# Patient Record
Sex: FEMALE | Race: WHITE | Employment: FULL TIME | URBAN - METROPOLITAN AREA
[De-identification: names, ages, dates, MRNs, and addresses within clinical notes are randomized per-mention and may not be internally consistent; named-entity substitution may affect disease eponyms.]

---

## 2017-04-21 RX ORDER — ESTRADIOL 0.1 MG/G
CREAM VAGINAL
Qty: 42.5 G | Refills: 0 | Status: SHIPPED | OUTPATIENT
Start: 2017-04-21 | End: 2017-10-23

## 2017-04-21 NOTE — TELEPHONE ENCOUNTER
Pt had mammogram done at [facility] in 9/2016. Pt will fax results to our office. Pt will call back to schedule annual in July. Refill ordered.

## 2017-04-21 NOTE — TELEPHONE ENCOUNTER
Please inform pt she needs to go for mammogram.  [id number] [address] Bauer for refill   Please make sure she has her annual visit scheduled as well

## 2017-10-23 ENCOUNTER — OFFICE VISIT (OUTPATIENT)
Dept: OBGYN CLINIC | Facility: CLINIC | Age: 57
End: 2017-10-23

## 2017-10-23 VITALS
BODY MASS INDEX: 26.1 KG/M2 | HEIGHT: 63.75 IN | SYSTOLIC BLOOD PRESSURE: 108 MMHG | HEART RATE: 70 BPM | WEIGHT: 151 LBS | DIASTOLIC BLOOD PRESSURE: 64 MMHG

## 2017-10-23 DIAGNOSIS — Z01.419 WELL WOMAN EXAM WITH ROUTINE GYNECOLOGICAL EXAM: Primary | ICD-10-CM

## 2017-10-23 DIAGNOSIS — N95.2 VAGINAL ATROPHY: ICD-10-CM

## 2017-10-23 PROCEDURE — 99396 PREV VISIT EST AGE 40-64: CPT | Performed by: NURSE PRACTITIONER

## 2017-10-23 RX ORDER — ESTRADIOL 0.1 MG/G
1 CREAM VAGINAL
Qty: 42.5 G | Refills: 1 | Status: SHIPPED | OUTPATIENT
Start: 2017-10-23 | End: 2017-10-31

## 2017-10-23 NOTE — PROGRESS NOTES
Here for Routine Annual Exam  No concerns, needs RX for vaginal Estrace Cream.  Does mammogram with [facility], last one was normal earlier this year. No C/O    ROS: No Cardiac, Respiratory, GI,  or Neurological symptoms.     PE:  GENERAL: well developed, w

## 2017-10-30 NOTE — TELEPHONE ENCOUNTER
Pt was seen by jessa on 10/23 and was given 1 refill of her Estradoil cream  Pt states that will only last her 2 mo.   Pt is looking to get a refill to last her the whole year until her next annual   Please advise pt if this is possible

## 2017-10-30 NOTE — TELEPHONE ENCOUNTER
[facility], patient can have pharmacy send refill request when due or we can write a new RX

## 2017-10-31 RX ORDER — ESTRADIOL 0.1 MG/G
1 CREAM VAGINAL
Qty: 42.5 G | Refills: 3 | Status: SHIPPED | OUTPATIENT
Start: 2017-11-01 | End: 2018-05-30

## 2018-02-27 ENCOUNTER — TELEPHONE (OUTPATIENT)
Dept: OBGYN CLINIC | Facility: CLINIC | Age: 58
End: 2018-02-27

## 2018-02-27 NOTE — TELEPHONE ENCOUNTER
Last OV: 10/23/2017  Last refill date: 10/30/2017  Next appt.: N/A    Received fax requesting we change RX to Estradiol Vag Crm W/ APPL 42.5 G 0.01%. This will save patient money. Please advise and send if OK to change.   Patient currently prescribed brand

## 2018-02-27 NOTE — TELEPHONE ENCOUNTER
[facility]; spoke with Dawit Contreras. She states that they will fill the generic if patient requests, but the pt's insurance only covers brand name. She is unsure why we received the fax.

## 2018-05-30 ENCOUNTER — TELEPHONE (OUTPATIENT)
Dept: OBGYN CLINIC | Facility: CLINIC | Age: 58
End: 2018-05-30

## 2018-05-30 RX ORDER — ESTRADIOL 0.1 MG/G
1 CREAM VAGINAL
Qty: 42.5 G | Refills: 1 | Status: SHIPPED | OUTPATIENT
Start: 2018-05-30

## 2018-05-30 NOTE — TELEPHONE ENCOUNTER
Fax received from [employer] requesting refill on Estradiol Vag cream 0.01%. Pt last seen 10/2017; Med ordered with 3 refills to [facility]. Order changed to [facility] to last until annual due.

## 2018-11-26 NOTE — TELEPHONE ENCOUNTER
Pt last seen 10/23/17; over due for annual.  Routed to [facility] staff. Please schedule and route back to RN for refill.

## 2018-12-03 RX ORDER — ESTRADIOL 0.1 MG/G
CREAM VAGINAL
Qty: 42.5 G | Refills: 0 | OUTPATIENT
Start: 2018-12-03

## 2022-03-25 ENCOUNTER — EMPLOYEE HEALTH (OUTPATIENT)
Dept: OTHER | Facility: HOSPITAL | Age: 62
End: 2022-03-25
Attending: PREVENTIVE MEDICINE

## 2022-03-25 DIAGNOSIS — Z11.1 SCREENING-PULMONARY TB: Primary | ICD-10-CM

## 2022-03-25 PROCEDURE — 86480 TB TEST CELL IMMUN MEASURE: CPT

## 2022-03-28 LAB
M TB IFN-G CD4+ T-CELLS BLD-ACNC: 0.03 IU/ML
M TB TUBERC IFN-G BLD QL: NEGATIVE
M TB TUBERC IGNF/MITOGEN IGNF CONTROL: 7.74 IU/ML
QFT TB1 AG MINUS NIL: 0 IU/ML
QFT TB2 AG MINUS NIL: 0.05 IU/ML

## 2022-09-27 ENCOUNTER — HOSPITAL ENCOUNTER (OUTPATIENT)
Age: 62
Discharge: HOME | End: 2022-09-27
Payer: COMMERCIAL

## 2022-09-27 VITALS
DIASTOLIC BLOOD PRESSURE: 87 MMHG | HEART RATE: 85 BPM | WEIGHT: 150 LBS | BODY MASS INDEX: 26 KG/M2 | TEMPERATURE: 98 F | SYSTOLIC BLOOD PRESSURE: 121 MMHG | OXYGEN SATURATION: 98 % | RESPIRATION RATE: 18 BRPM

## 2022-09-27 DIAGNOSIS — R19.7 DIARRHEA OF PRESUMED INFECTIOUS ORIGIN: ICD-10-CM

## 2022-09-27 DIAGNOSIS — B34.9 VIRAL ILLNESS: Primary | ICD-10-CM

## 2022-09-27 LAB
C DIFF TOX B STL QL: NEGATIVE
SARS-COV-2 RNA RESP QL NAA+PROBE: NOT DETECTED

## 2022-09-27 PROCEDURE — U0002 COVID-19 LAB TEST NON-CDC: HCPCS | Performed by: NURSE PRACTITIONER

## 2022-09-27 PROCEDURE — 99204 OFFICE O/P NEW MOD 45 MIN: CPT | Performed by: NURSE PRACTITIONER

## 2022-09-27 RX ORDER — DICYCLOMINE HCL 20 MG
20 TABLET ORAL 4 TIMES DAILY PRN
Qty: 30 TABLET | Refills: 0 | Status: SHIPPED | OUTPATIENT
Start: 2022-09-27 | End: 2022-10-27

## 2022-09-27 NOTE — ED INITIAL ASSESSMENT (HPI)
Pt c/o diarrhea, fatigue and body aches. Pt states her symptoms started Sunday, Monday. Pt states she had the covid vaccine 09/14/2022.

## 2022-11-02 ENCOUNTER — IMMUNIZATION (OUTPATIENT)
Dept: LAB | Facility: HOSPITAL | Age: 62
End: 2022-11-02
Attending: PREVENTIVE MEDICINE
Payer: COMMERCIAL

## 2022-11-02 DIAGNOSIS — Z23 NEED FOR VACCINATION: Primary | ICD-10-CM

## 2022-11-02 PROCEDURE — 90471 IMMUNIZATION ADMIN: CPT

## 2022-12-20 ENCOUNTER — TELEPHONE (OUTPATIENT)
Dept: OPHTHALMOLOGY | Facility: CLINIC | Age: 62
End: 2022-12-20

## 2022-12-20 NOTE — TELEPHONE ENCOUNTER
Pt had both \"Cheek injection fillers\" by dermatology-  - pt is there for a follow up and states that she has had tearing in both eyes since the procedure 4 weeks ago. Dermatology recommended she follow up with Ophthalmology for a \"tearing evaluation\" L>R x 4 weeks - she was at her post op apt now and the dermatologist just walked into her room- she will call me back to schedule.      Pt called me back- I scheduled her for an office visit jan 4-11 she will be out of town  Apt scheduled on REBECCA 3- OV tearing evaluation

## 2022-12-20 NOTE — TELEPHONE ENCOUNTER
Pt called stating pt is having tearing of the both eye after a procedure with dermatology. Pt is requesting an appointment.   Call

## 2023-07-16 ENCOUNTER — HOSPITAL ENCOUNTER (OUTPATIENT)
Age: 63
Discharge: HOME | End: 2023-07-16
Attending: EMERGENCY MEDICINE
Payer: COMMERCIAL

## 2023-07-16 ENCOUNTER — APPOINTMENT (OUTPATIENT)
Dept: GENERAL RADIOLOGY | Age: 63
End: 2023-07-16
Attending: EMERGENCY MEDICINE
Payer: COMMERCIAL

## 2023-07-16 VITALS
OXYGEN SATURATION: 100 % | HEART RATE: 57 BPM | SYSTOLIC BLOOD PRESSURE: 111 MMHG | TEMPERATURE: 97 F | DIASTOLIC BLOOD PRESSURE: 69 MMHG | RESPIRATION RATE: 15 BRPM

## 2023-07-16 DIAGNOSIS — M23.92 INTERNAL DERANGEMENT OF LEFT KNEE: Primary | ICD-10-CM

## 2023-07-16 PROCEDURE — 99213 OFFICE O/P EST LOW 20 MIN: CPT

## 2023-07-16 PROCEDURE — 73560 X-RAY EXAM OF KNEE 1 OR 2: CPT | Performed by: EMERGENCY MEDICINE

## 2023-07-16 NOTE — DISCHARGE INSTRUCTIONS
Ice frequently  Knee brace when ambulating  Ibuprofen 400mg every six hours  Elevate when not ambulating  Keep your follow up appointment with orthopedics

## 2023-07-16 NOTE — ED INITIAL ASSESSMENT (HPI)
C/o  atraumatic lt knee pain that has been ongoing for a wk. + swelling to kneecap and pain w/ walking. Pt has been taking ibuprofen  , wearing a brace and icing. Denies relief. Has Orthopedic appointment scheduled next wk.

## 2024-11-13 ENCOUNTER — HOSPITAL ENCOUNTER (OUTPATIENT)
Age: 64
Discharge: HOME | End: 2024-11-13
Payer: COMMERCIAL

## 2024-11-13 VITALS
HEART RATE: 75 BPM | OXYGEN SATURATION: 96 % | SYSTOLIC BLOOD PRESSURE: 135 MMHG | RESPIRATION RATE: 18 BRPM | WEIGHT: 150 LBS | DIASTOLIC BLOOD PRESSURE: 83 MMHG | BODY MASS INDEX: 26 KG/M2 | TEMPERATURE: 97 F

## 2024-11-13 DIAGNOSIS — L30.9 PERIORBITAL DERMATITIS: Primary | ICD-10-CM

## 2024-11-13 PROCEDURE — 99213 OFFICE O/P EST LOW 20 MIN: CPT | Performed by: NURSE PRACTITIONER

## 2024-11-13 RX ORDER — METHYLPREDNISOLONE 4 MG/1
TABLET ORAL
Qty: 1 EACH | Refills: 1 | Status: SHIPPED | OUTPATIENT
Start: 2024-11-13

## 2024-11-13 NOTE — ED INITIAL ASSESSMENT (HPI)
Patient states started in the Summer- with watery eyes- saw specialist  Patient states bilateral eyes - swelling, redness, itchiness- for a couple of months

## 2024-11-13 NOTE — ED PROVIDER NOTES
Patient Seen in: [facility]      History   No chief complaint on file.    Stated Complaint: eyes swollen    Subjective:   This is a 64-year-old female with below stated medical history.  Presents to immediate care for redness, swelling, and itchiness around both eyes.  Symptoms started over the summer.  She has seen multiple specialists with no relief.  Denies any new soaps, detergents, foods, drinks, or hygiene products.  Reports at times  eyes feel watery.  She has used multiple over-the-counter products, oral antihistamines, antihistamine drops, antibiotic drops, steroid creams, and other lotions with no relief.  She has stopped using make-up.  No lip or throat swelling.  No difficulty breathing or wheezing.    The history is provided by the patient.             Objective:     Past Medical History:    H/O mammogram    benign    Hyperlipidemia    Pap smear for cervical cancer screening    Pap WNL  2002 Beningn cellular changes    Thyroid disease              Past Surgical History:   Procedure Laterality Date    Appendectomy      Colonoscopy  10/15/12 - VIANCA Griffith    diverticuli, hemorrhoids, repeat 2022.    Colonoscopy  10/2012    wnl    Colposcopy, cervix w/upper adjacent vagina; w/biopsy(s), cervix  1995    chronic cervicitis,nabothian cysts, squamous metaplasia    Other surgical history      tonsel removed                Social History     Socioeconomic History    Marital status:    Tobacco Use    Smoking status: Never     Passive exposure: Never    Smokeless tobacco: Never   Vaping Use    Vaping status: Never Used   Substance and Sexual Activity    Alcohol use: No     Alcohol/week: 0.0 standard drinks of alcohol    Drug use: No    Sexual activity: Yes     Partners: Male   Other Topics Concern    Exercise Yes     Comment: [employer]    Seat Belt Yes    Special Diet No     Social Drivers of Health     Food Insecurity: Low Risk  (6/6/2023)    Received from [facility]  [facility], [facility]    Food Insecurity     Have there been times that your food ran out, and you didn't have money to get more?: No     Are there times that you worry that this might happen?: No   Transportation Needs: Low Risk  (6/6/2023)    Received from [facility], [facility]    Transportation Needs     Do you have trouble getting transportation to medical appointments?: No              Review of Systems   Constitutional:  Negative for chills and fever.   HENT:  Negative for congestion and sore throat.    Eyes:  Negative for photophobia and visual disturbance.   Respiratory:  Negative for cough, shortness of breath and wheezing.    Cardiovascular:  Negative for chest pain, palpitations and leg swelling.   Gastrointestinal:  Negative for abdominal pain, constipation, diarrhea, nausea and vomiting.   Genitourinary:  Negative for dysuria.   Musculoskeletal:  Negative for back pain, neck pain and neck stiffness.   Skin:  Positive for rash.   Neurological:  Negative for headaches.       Positive for stated complaint: eyes swollen  Other systems are as noted in HPI.  Constitutional and vital signs reviewed.      All other systems reviewed and negative except as noted above.    Physical Exam     ED Triage Vitals [11/13/24 1100]   /83   Pulse 75   Resp 18   Temp 97.4 °F (36.3 °C)   Temp src Temporal   SpO2 96 %   O2 Device None (Room air)       Current Vitals:   Vital Signs  BP: 135/83  Pulse: 75  Resp: 18  Temp: 97.4 °F (36.3 °C)  Temp src: Temporal    Oxygen Therapy  SpO2: 96 %  O2 Device: None (Room air)      Right Eye Chart Acuity: 20/30, Uncorrected  Left Eye Chart Acuity: 20/70, Uncorrected  Physical Exam  Vitals and nursing note reviewed.   Constitutional:       General: She is not in acute distress.     Appearance: Normal appearance. She is not ill-appearing, toxic-appearing or diaphoretic.   HENT:      Head: Normocephalic and atraumatic.       Right Ear: External ear normal.      Left Ear: External ear normal.      Nose: Nose normal.      Mouth/Throat:      Mouth: Mucous membranes are moist.      Pharynx: Oropharynx is clear.   Eyes:      General:         Right eye: No discharge.         Left eye: No discharge.      Extraocular Movements: Extraocular movements intact.      Conjunctiva/sclera: Conjunctivae normal.   Cardiovascular:      Rate and Rhythm: Normal rate.   Pulmonary:      Effort: Pulmonary effort is normal.   Musculoskeletal:      Cervical back: Neck supple.   Skin:     General: Skin is warm and dry.      Capillary Refill: Capillary refill takes less than 2 seconds.      Findings: Rash present.          Neurological:      Mental Status: She is alert and oriented to person, place, and time.   Psychiatric:         Mood and Affect: Mood normal.         Behavior: Behavior normal.             ED Course   Labs Reviewed - No data to display         DC home        MDM        Vital signs stable.  Patient is well-appearing and nontoxic looking.  Presents to immediate care for redness, itching, and swelling around both eyes.    Differential diagnosis includes was not limited to preseptal cellulitis, periorbital cellulitis, periorbital dermatitis, eczema, allergic reaction, conjunctivitis    Bilateral conjunctiva are noninjected.  Vision is grossly intact.  There is erythema and irritation in the periorbital area bilaterally.  No patches or scales to suggest eczema.  No evidence of preseptal or periorbital cellulitis on exam.    Patient has tried multiple treatments for these ongoing chronic symptoms with no relief.    We discussed treatment with oral steroids.  I discussed that these oral steroids may cause temporary relief but do not come to the cause of the chronic issue.      DC home.  Recommended close follow-up with dermatology and an allergist.  Reasons to return reviewed.  She verbalized understanding, and agreed with plan of care.  All  questions answered.        Medical Decision Making      Disposition and Plan     Clinical Impression:  1. Periorbital dermatitis         Disposition:  Discharge  11/13/2024 11:17 am    Follow-up:  [facility] [address]  [facility] [address] [phone]  In 1 week            Medications Prescribed:  Discharge Medication List as of 11/13/2024 11:18 AM        START taking these medications    Details   methylPREDNISolone (MEDROL) 4 MG Oral Tablet Therapy Pack Dosepack: take as directed, Normal, Disp-1 each, R-1                 Supplementary Documentation:

## 2025-01-09 ENCOUNTER — MOBILE ENCOUNTER (OUTPATIENT)
Dept: MEDSURG UNIT | Facility: HOSPITAL | Age: 65
End: 2025-01-09

## 2025-01-09 ENCOUNTER — LAB ENCOUNTER (OUTPATIENT)
Dept: LAB | Age: 65
End: 2025-01-09
Payer: COMMERCIAL

## 2025-01-09 DIAGNOSIS — Z00.00 ROUTINE GENERAL MEDICAL EXAMINATION AT HEALTH CARE FACILITY: ICD-10-CM

## 2025-01-09 DIAGNOSIS — Z00.00 GENERAL MEDICAL EXAMINATION: Primary | ICD-10-CM

## 2025-01-09 DIAGNOSIS — Z00.00 ROUTINE GENERAL MEDICAL EXAMINATION AT A HEALTH CARE FACILITY: Primary | ICD-10-CM

## 2025-01-09 DIAGNOSIS — Z00.00 GENERAL MEDICAL EXAMINATION: ICD-10-CM

## 2025-01-09 LAB
ALBUMIN SERPL-MCNC: 4.2 G/DL (ref 3.2–4.8)
ALBUMIN/GLOB SERPL: 1.4 {RATIO} (ref 1–2)
ALP LIVER SERPL-CCNC: 69 U/L
ALT SERPL-CCNC: 25 U/L
ANION GAP SERPL CALC-SCNC: 6 MMOL/L (ref 0–18)
AST SERPL-CCNC: 31 U/L (ref ?–34)
BASOPHILS # BLD AUTO: 0.02 X10(3) UL (ref 0–0.2)
BASOPHILS NFR BLD AUTO: 0.5 %
BILIRUB SERPL-MCNC: 1.2 MG/DL (ref 0.2–1.1)
BUN BLD-MCNC: 14 MG/DL (ref 9–23)
CALCIUM BLD-MCNC: 9.8 MG/DL (ref 8.7–10.4)
CHLORIDE SERPL-SCNC: 108 MMOL/L (ref 98–112)
CHOLEST SERPL-MCNC: 192 MG/DL (ref ?–200)
CO2 SERPL-SCNC: 29 MMOL/L (ref 21–32)
CREAT BLD-MCNC: 0.76 MG/DL
EGFRCR SERPLBLD CKD-EPI 2021: 88 ML/MIN/1.73M2 (ref 60–?)
EOSINOPHIL # BLD AUTO: 0.24 X10(3) UL (ref 0–0.7)
EOSINOPHIL NFR BLD AUTO: 5.7 %
ERYTHROCYTE [DISTWIDTH] IN BLOOD BY AUTOMATED COUNT: 11.9 %
EST. AVERAGE GLUCOSE BLD GHB EST-MCNC: 103 MG/DL (ref 68–126)
FASTING PATIENT LIPID ANSWER: YES
GLOBULIN PLAS-MCNC: 2.9 G/DL (ref 2–3.5)
GLUCOSE BLD-MCNC: 84 MG/DL (ref 70–99)
HBA1C MFR BLD: 5.2 % (ref ?–5.7)
HCT VFR BLD AUTO: 43.5 %
HDLC SERPL-MCNC: 48 MG/DL (ref 40–59)
HGB BLD-MCNC: 14.5 G/DL
IMM GRANULOCYTES # BLD AUTO: 0.01 X10(3) UL (ref 0–1)
IMM GRANULOCYTES NFR BLD: 0.2 %
LDLC SERPL CALC-MCNC: 120 MG/DL (ref ?–100)
LYMPHOCYTES # BLD AUTO: 1.4 X10(3) UL (ref 1–4)
LYMPHOCYTES NFR BLD AUTO: 33.2 %
MCH RBC QN AUTO: 32.7 PG (ref 26–34)
MCHC RBC AUTO-ENTMCNC: 33.3 G/DL (ref 31–37)
MCV RBC AUTO: 98 FL
MONOCYTES # BLD AUTO: 0.31 X10(3) UL (ref 0.1–1)
MONOCYTES NFR BLD AUTO: 7.3 %
NEUTROPHILS # BLD AUTO: 2.24 X10 (3) UL (ref 1.5–7.7)
NEUTROPHILS # BLD AUTO: 2.24 X10(3) UL (ref 1.5–7.7)
NEUTROPHILS NFR BLD AUTO: 53.1 %
NONHDLC SERPL-MCNC: 144 MG/DL (ref ?–130)
OSMOLALITY SERPL CALC.SUM OF ELEC: 296 MOSM/KG (ref 275–295)
PLATELET # BLD AUTO: 198 10(3)UL (ref 150–450)
POTASSIUM SERPL-SCNC: 4.3 MMOL/L (ref 3.5–5.1)
PROT SERPL-MCNC: 7.1 G/DL (ref 5.7–8.2)
RBC # BLD AUTO: 4.44 X10(6)UL
SODIUM SERPL-SCNC: 143 MMOL/L (ref 136–145)
T4 FREE SERPL-MCNC: 1.3 NG/DL (ref 0.8–1.7)
TRIGL SERPL-MCNC: 136 MG/DL (ref 30–149)
TSI SER-ACNC: 1.55 UIU/ML (ref 0.55–4.78)
VIT D+METAB SERPL-MCNC: 44.5 NG/ML (ref 30–100)
VLDLC SERPL CALC-MCNC: 24 MG/DL (ref 0–30)
WBC # BLD AUTO: 4.2 X10(3) UL (ref 4–11)

## 2025-01-09 PROCEDURE — 80053 COMPREHEN METABOLIC PANEL: CPT

## 2025-01-09 PROCEDURE — 83036 HEMOGLOBIN GLYCOSYLATED A1C: CPT

## 2025-01-09 PROCEDURE — 82306 VITAMIN D 25 HYDROXY: CPT

## 2025-01-09 PROCEDURE — 84443 ASSAY THYROID STIM HORMONE: CPT

## 2025-01-09 PROCEDURE — 84439 ASSAY OF FREE THYROXINE: CPT

## 2025-01-09 PROCEDURE — 80061 LIPID PANEL: CPT

## 2025-01-09 PROCEDURE — 85025 COMPLETE CBC W/AUTO DIFF WBC: CPT

## 2025-01-09 PROCEDURE — 36415 COLL VENOUS BLD VENIPUNCTURE: CPT

## 2025-01-21 ENCOUNTER — NURSE ONLY (OUTPATIENT)
Dept: ALLERGY | Facility: CLINIC | Age: 65
End: 2025-01-21
Payer: COMMERCIAL

## 2025-01-21 ENCOUNTER — OFFICE VISIT (OUTPATIENT)
Dept: ALLERGY | Facility: CLINIC | Age: 65
End: 2025-01-21

## 2025-01-21 VITALS — HEIGHT: 64 IN | WEIGHT: 150 LBS | BODY MASS INDEX: 25.61 KG/M2

## 2025-01-21 DIAGNOSIS — H01.139 ECZEMATOUS DERMATITIS OF EYELID, UNSPECIFIED LATERALITY: ICD-10-CM

## 2025-01-21 DIAGNOSIS — Z23 NEED FOR COVID-19 VACCINE: ICD-10-CM

## 2025-01-21 DIAGNOSIS — J30.2 SEASONAL AND PERENNIAL ALLERGIC RHINOCONJUNCTIVITIS: Primary | ICD-10-CM

## 2025-01-21 DIAGNOSIS — Z23 FLU VACCINE NEED: ICD-10-CM

## 2025-01-21 DIAGNOSIS — H10.10 SEASONAL AND PERENNIAL ALLERGIC RHINOCONJUNCTIVITIS: Primary | ICD-10-CM

## 2025-01-21 DIAGNOSIS — J30.89 SEASONAL AND PERENNIAL ALLERGIC RHINOCONJUNCTIVITIS: Primary | ICD-10-CM

## 2025-01-21 PROCEDURE — 95004 PERQ TESTS W/ALRGNC XTRCS: CPT | Performed by: ALLERGY & IMMUNOLOGY

## 2025-01-21 PROCEDURE — 99204 OFFICE O/P NEW MOD 45 MIN: CPT | Performed by: ALLERGY & IMMUNOLOGY

## 2025-01-21 RX ORDER — TACROLIMUS 1 MG/G
OINTMENT TOPICAL
Qty: 60 G | Refills: 0 | Status: SHIPPED | OUTPATIENT
Start: 2025-01-21

## 2025-01-21 RX ORDER — LEVOTHYROXINE SODIUM 50 UG/1
50 TABLET ORAL
COMMUNITY

## 2025-01-21 RX ORDER — LEVOCETIRIZINE DIHYDROCHLORIDE 5 MG/1
5 TABLET, FILM COATED ORAL EVERY EVENING
Qty: 90 TABLET | Refills: 1 | Status: SHIPPED | OUTPATIENT
Start: 2025-01-21

## 2025-01-21 RX ORDER — HYDROCORTISONE 25 MG/G
OINTMENT TOPICAL
Qty: 28 G | Refills: 0 | Status: SHIPPED | OUTPATIENT
Start: 2025-01-21

## 2025-01-21 NOTE — PROGRESS NOTES
Hannah Santos is a 64 year old female.    HPI:     Chief Complaint   Patient presents with    Consult     Pt states her eyes have been watery, feels itchy, no recent antihistamines     Patient is a 64-year-old female who presents for allergy consultation upon referral with her PCP with a chief complaint of concern for allergie    Review of records notes prior urgent care evaluation on November 13, 2024 for a periorbital red rash that was itchy in nature.  Urgent care notes occurring since summer.  Urgent care recommended a Medrol Dosepak and dermatology evaluation.  No oral symptoms or respiratory issues at that time.    Review of records show prior TSH from June 2023 showing a TSH of 1.62 medication list includes Synthroid      Today patient reports      allergies  Duration:  a few year   Timing: come and go  Symptoms: water eyes , blepharitis, dry scaly patch around eyes    Saw optho  Prior derm (dr chacon at [facility]) eval to see again in near future   Tx with topical abx with steroids,   No hives or rashes   On synthroid   Dx with dry eyes   Improved with tobradex drops   Severity: moderate  Triggers: allergies   Tried: h1 eye drops(no better)   Pets: dog   Prior Derm evaluation:  yes     Hx of asthma, ad, or food allergy:  denies    Ok with cosmetics     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 see above skin testing to screen for allergic environmental triggers  Positive histamine control          HISTORY:  Past Medical History:    H/O mammogram    benign    Hyperlipidemia    Pap smear for cervical cancer screening    Pap WNL  2002 Beningn cellular changes    Thyroid disease      Past Surgical History:   Procedure Laterality Date    Appendectomy      Colonoscopy  10/15/12 - VIANCA Griffith    diverticuli, hemorrhoids, repeat 2022.    Colonoscopy  10/2012    wnl    Colposcopy, cervix w/upper adjacent vagina; w/biopsy(s), cervix  1995    chronic cervicitis,nabothian cysts, squamous metaplasia    Other surgical history      tonsel removed       Family History   Problem Relation Age of Onset    Heart Disorder Maternal Grandfather     Heart Disorder Paternal Grandfather     Cancer Father     Heart Disorder Mother       Social History:   Social History     Socioeconomic History    Marital status:    Tobacco Use    Smoking status: Never     Passive exposure: Never    Smokeless tobacco: Never   Vaping Use    Vaping status: Never Used   Substance and Sexual Activity    Alcohol use: No     Alcohol/week: 0.0 standard drinks of alcohol    Drug use: No    Sexual activity: Yes     Partners: Male   Other Topics Concern    Exercise Yes     Comment: [employer]    Seat Belt Yes    Special Diet No     Social Drivers of Health     Food Insecurity: Low Risk  (12/30/2024)    Received from [facility]    Food Insecurity     Have there been times that your food ran out, and you didn't have money to get more?: No     Are there times that you worry that this might happen?: No   Transportation Needs: Low Risk  (12/30/2024)    Received from [facility]    Transportation Needs     Do you have trouble getting transportation to medical appointments?: No     How do you normally get to and from your appointments?: Other        Medications (Active prior to today's visit):  Current Outpatient Medications   Medication Sig Dispense Refill    levothyroxine 50 MCG Oral Tab Take 1 tablet (50 mcg total) by mouth before breakfast.      hydrocortisone 2.5 % External Ointment Apply to involved areas twice a day 28 g 0    tacrolimus 0.1 % External Ointment Apply twice a a day as needed to involved areas 60 g 0    Estradiol (ESTRACE) 0.1 MG/GM Vaginal Cream Place 1 g vaginally 3 (three) times a week. 42.5 g 1    Misc Natural Products (GLUCOSAMINE CHOND COMPLEX/MSM OR) Take by mouth.      Atorvastatin Calcium 10 MG Oral Tab TK 1 T PO D  1    methylPREDNISolone (MEDROL) 4 MG Oral Tablet Therapy Pack Dosepack: take as directed 1 each 1        Allergies:  Allergies[1]      ROS:     Allergic/Immuno:  See HPI  Cardiovascular:  Negative for irregular heartbeat/palpitations, chest pain, edema  Constitutional:  Negative night sweats,weight loss, irritability and lethargy  Endocrine:  Negative for cold intolerance, polydipsia and polyphagia  ENMT:  Negative for ear drainage, hearing loss and nasal drainage  Eyes:  Negative for eye discharge and vision loss  Gastrointestinal:  Negative for abdominal pain, diarrhea and vomiting  Genitourinary:  Negative for dysuria and hematuria  Hema/Lymph:  Negative for easy bleeding and easy bruising  Integumentary:  Negative for pruritus and rash  Musculoskeletal:  Negative for joint symptoms  Neurological:  Negative for dizziness, seizures  Psychiatric:  Negative for inappropriate interaction and psychiatric symptoms  Respiratory:  Negative for cough, dyspnea and wheezing      PHYSICAL EXAM:   Constitutional: responsive, no acute distress noted  Head/Face: NC/Atraumatic  Eyes/Vision: conjunctiva and lids are normal extraocular motion is intact   Ears/Audiometry: tympanic membranes are normal bilaterally hearing is grossly intact  Nose/Mouth/Throat: nose and throat are clear mucous membranes are moist   Neck/Thyroid: neck is supple without adenopathy  Lymphatic: no abnormal cervical, supraclavicular or axillary adenopathy is noted  Respiratory: normal to inspection lungs are clear to auscultation bilaterally normal respiratory effort   Cardiovascular: regular rate and rhythm no murmurs, gallups, or rubs  Abdomen: soft non-tender non-distended  Skin/Hair: no unusual rashes present  Extremities: no edema, cyanosis, or clubbing  Neurological:Oriented to time, place, person & situation       ASSESSMENT/PLAN:   Assessment   Encounter Diagnoses   Name Primary?    Seasonal and perennial allergic rhinoconjunctivitis Yes    Eczematous dermatitis of eyelid, unspecified laterality     Flu vaccine need     Need for COVID-19 vaccine         Skin testing today to common indoor and outdoor environmental allergies was negative on scratch testing.  Patient deferred intradermal testing    Positive histamine control      #1 allergic rhinitis  See above clinical history  Patient reports no improvement in eye symptoms with antihistamine eyedrops.  Patient did improve with steroid eyedrops  Follow-up with ophthalmology if still feeling the need for steroid eyedrops as they need to monitor ocular pressures and glaucoma   If allergies are detected will consider Xyzal, levocetirizine 5 mg once a day as an antihistamine for symptoms of runny nose sneezing itchy watery eyes  May add Flonase or Nasacort 2 sprays per nostril once a day if having prominent nasal congestion or postnasal drip     #2 periocular dermatitis  Reviewed potential blepharitis versus environmental allergies versus contact dermatitis.    Trial of eyelid scrubs with Sim & Sim baby shampoo trial of hydrocortisone 2.5% ointment twice a day as a topical steroid if getting red dry and scaly  If not improving with hydrocortisone may consider Protopic as a steroid free topical use to treat eczematous dermatitis.  Dove is a soap or Cetaphil as a cleanser  Moisturizers include Cetaphil CeraVe Vanicream Aquaphor    #3 flu vaccine up-to-date through work      #4 COVID-vaccine booster recommended.  Please check with local pharmacy as we do not stock the vaccine              Orders This Visit:  No orders of the defined types were placed in this encounter.      Meds This Visit:  Requested Prescriptions     Signed Prescriptions Disp Refills    hydrocortisone 2.5 % External Ointment 28 g 0     Sig: Apply to involved areas twice a day    tacrolimus 0.1 % External Ointment 60 g 0     Sig: Apply twice a a day as needed to involved areas       Imaging & Referrals:  None     1/21/2025  Adam Gonzalez MD      If medication samples were provided today, they were provided solely for patient education  and training related to self administration of these medications.  Teaching, instruction and sample was provided to the patient by myself.  Teaching included  a review of potential adverse side effects as well as potential efficacy.  Patient's questions were answered in regards to medication administration and dosing and potential side effects. Teaching was provided via the teach back method         [1] No Known Allergies

## 2025-01-21 NOTE — PATIENT INSTRUCTIONS
#1 allergic rhinitis  See above clinical history  Patient reports no improvement in eye symptoms with antihistamine eyedrops.  Patient did improve with steroid eyedrops  Follow-up with ophthalmology if still feeling the need for steroid eyedrops as they need to monitor ocular pressures and glaucoma   If allergies are detected will consider Xyzal, levocetirizine 5 mg once a day as an antihistamine for symptoms of runny nose sneezing itchy watery eyes  May add Flonase or Nasacort 2 sprays per nostril once a day if having prominent nasal congestion or postnasal drip     #2 periocular dermatitis  Reviewed potential blepharitis versus environmental allergies versus contact dermatitis.    Trial of eyelid scrubs with Sim & Sim baby shampoo trial of hydrocortisone 2.5% ointment twice a day as a topical steroid if getting red dry and scaly  If not improving with hydrocortisone may consider Protopic as a steroid free topical use to treat eczematous dermatitis.  Dove is a soap or Cetaphil as a cleanser  Moisturizers include Cetaphil CeraVe Vanicream Aquaphor    #3 flu vaccine up-to-date through work      #4 COVID-vaccine booster recommended.  Please check with local pharmacy as we do not stock the vaccine             Orders This Visit:  No orders of the defined types were placed in this encounter.      Meds This Visit:  Requested Prescriptions     Signed Prescriptions Disp Refills    hydrocortisone 2.5 % External Ointment 28 g 0     Sig: Apply to involved areas twice a day    tacrolimus 0.1 % External Ointment 60 g 0     Sig: Apply twice a a day as needed to involved areas     Okay be there shortly

## 2025-01-28 ENCOUNTER — TELEPHONE (OUTPATIENT)
Dept: ALLERGY | Facility: CLINIC | Age: 65
End: 2025-01-28

## 2025-01-28 NOTE — TELEPHONE ENCOUNTER
Medication Being Authorized    tacrolimus 0.1 % External Ointment  Apply twice a a day as needed to involved areas  Dispense: 60 g Refills: 0   Start: 1/21/2025   Class: Normal Diagnoses: Seasonal and perennial allergic rhinoconjunctivitis; Eczematous dermatitis of eyelid, unspecified laterality   This order has been released to its destination.  To be filled at: [facility]/pharmacy #[id number] - [place], [place] - [address] [phone], [phone]

## 2025-01-28 NOTE — TELEPHONE ENCOUNTER
Prior authorization approved  Payer: [employer] Case ID: [id number]    [phone]    [phone]  Note from payer: CaseId:[id number];Status:Approved;Review Type:Prior Auth;Coverage Start Date:12/29/2024;Coverage End Date:01/28/2026;  Approval Details    Authorized from December 29, 2024 to January 28, 2026    Spoke to Rosemary in pharmacy. Pt already picked up medication.   Pharmacy reports it cost $15.

## 2025-02-19 DIAGNOSIS — J30.89 SEASONAL AND PERENNIAL ALLERGIC RHINOCONJUNCTIVITIS: ICD-10-CM

## 2025-02-19 DIAGNOSIS — H10.10 SEASONAL AND PERENNIAL ALLERGIC RHINOCONJUNCTIVITIS: ICD-10-CM

## 2025-02-19 DIAGNOSIS — J30.2 SEASONAL AND PERENNIAL ALLERGIC RHINOCONJUNCTIVITIS: ICD-10-CM

## 2025-02-19 DIAGNOSIS — H01.139 ECZEMATOUS DERMATITIS OF EYELID, UNSPECIFIED LATERALITY: ICD-10-CM

## 2025-02-19 RX ORDER — TACROLIMUS 1 MG/G
OINTMENT TOPICAL
Qty: 60 G | Refills: 0 | Status: SHIPPED | OUTPATIENT
Start: 2025-02-19

## 2025-02-19 NOTE — TELEPHONE ENCOUNTER
Refill requested for    Name from pharmacy: TACROLIMUS 0.1% OINTMENT         Will file in chart as: TACROLIMUS 0.1 % External Ointment    Sig: Apply twice a a day as needed to involved areas    Disp: 60 g    Refills: 0 (Pharmacy requested: Not specified)    Start: 2/19/2025    Class: Normal    Non-formulary For: Seasonal and perennial allergic rhinoconjunctivitis; Eczematous dermatitis of eyelid, unspecified laterality    Last ordered: 4 weeks ago (1/21/2025) by Adam Gonzalez MD    Last refill: 1/22/2025    Rx #: [id number]    Antihistamines Passed 02/19/2025 12:01 AM   Protocol Details Appt in past 12 mos or next 1 mos    Medication is active on med list      To be filled at: [facility]/pharmacy #[id number] - [place], [place] - [address] [phone], [phone]          Last office visit: 01/25/2025    Previously advised to follow up in [place] one year follow up     F/U currently scheduled? Not at this time       ACTION: Refilled per protocol.

## 2025-05-22 NOTE — PROGRESS NOTES
OP labs on multiple pages, but not scanned in correctly.  Per Lab personnel, labs can be added on if reordered.  Orders placed on behalf of Dr Castillo.